# Patient Record
(demographics unavailable — no encounter records)

---

## 2020-01-07 NOTE — NUR
FEVER, COUGH X 2 DAYS. PATIENT AWAKE AND ALERT, WEAK, ATTACHED TO THE CARDIAC 
MONITOR. NO DISTRESS NOTED.

## 2020-01-07 NOTE — NUR
Patient discharged to home in stable condition. Written and verbal after care 
instructions given to mom and dad, both verbalizes understanding of 
instruction.